# Patient Record
Sex: MALE | Race: OTHER | NOT HISPANIC OR LATINO | Employment: FULL TIME | URBAN - METROPOLITAN AREA
[De-identification: names, ages, dates, MRNs, and addresses within clinical notes are randomized per-mention and may not be internally consistent; named-entity substitution may affect disease eponyms.]

---

## 2020-11-03 ENCOUNTER — HOSPITAL ENCOUNTER (EMERGENCY)
Facility: MEDICAL CENTER | Age: 25
End: 2020-11-04
Attending: EMERGENCY MEDICINE
Payer: COMMERCIAL

## 2020-11-03 DIAGNOSIS — R44.3 HALLUCINATIONS: ICD-10-CM

## 2020-11-03 DIAGNOSIS — F12.90 MARIJUANA USE: ICD-10-CM

## 2020-11-03 PROCEDURE — 99283 EMERGENCY DEPT VISIT LOW MDM: CPT

## 2020-11-04 VITALS
TEMPERATURE: 97.9 F | OXYGEN SATURATION: 92 % | SYSTOLIC BLOOD PRESSURE: 108 MMHG | DIASTOLIC BLOOD PRESSURE: 65 MMHG | HEIGHT: 69 IN | BODY MASS INDEX: 19.26 KG/M2 | HEART RATE: 78 BPM | WEIGHT: 130 LBS | RESPIRATION RATE: 20 BRPM

## 2020-11-04 PROCEDURE — A9270 NON-COVERED ITEM OR SERVICE: HCPCS | Performed by: EMERGENCY MEDICINE

## 2020-11-04 PROCEDURE — 700102 HCHG RX REV CODE 250 W/ 637 OVERRIDE(OP): Performed by: EMERGENCY MEDICINE

## 2020-11-04 RX ORDER — LORAZEPAM 1 MG/1
1 TABLET ORAL ONCE
Status: COMPLETED | OUTPATIENT
Start: 2020-11-04 | End: 2020-11-04

## 2020-11-04 RX ADMIN — LORAZEPAM 1 MG: 1 TABLET ORAL at 00:51

## 2020-11-04 NOTE — DISCHARGE INSTRUCTIONS
You were seen in emergency department for hallucinations in the setting of edible marijuana use.  You were given a medication that should make you sleepy.  We recommend that you relax at home and at home safe environment.    Your symptoms should improve over time.    Recommend taking caution when using marijuana products.    Please return to the emergency department seek medical attention if you develop:  Any new or worsening symptoms, difficulty breathing, ongoing vomiting, any other new or concerning findings

## 2020-11-04 NOTE — ED PROVIDER NOTES
"ED Provider Note    Scribed for Efren Perea M.D. by Juan Luis Quezada. 11/4/2020,  12:40 AM.    Means of Arrival: Walk-in  History obtained from: Patient  History limited by: None    CHIEF COMPLAINT  Chief Complaint   Patient presents with   • Hallucinations     ate 5mg THC, 5mg CBD to help sleep.        HPI  Anibal Reynoso is a 24 y.o. male who presents to the Emergency Department complaining of hallucinations after consuming 10 mg of THC in the form of a medical marijuana gummy shortly prior to arrival. He reports \"blanking out\" and multiple episodes of emesis while under the influence of THC. He states he feels moderately anxious in the room today. He denies any falls or accidents. He has no history of mental health issues and no history of other recreational drug use.    PPE Note: I personally donned full PPE for all patient encounters during this visit, including being clean-shaven with an N95 respirator mask, gloves, and goggles.     Scribe remained outside the patient's room and did not have any contact with the patient for the duration of patient encounter.      REVIEW OF SYSTEMS  GASTROINTESTINAL:  Vomiting  NEUROLOGIC:   Anxious, hallucinations, memory loss    See HPI for further details.   All other systems are negative.     PAST MEDICAL HISTORY  History reviewed. No pertinent past medical history.    FAMILY HISTORY  History reviewed. No pertinent family history.    SOCIAL HISTORY   reports that he has never smoked. He has never used smokeless tobacco. He reports that he does not drink alcohol or use drugs.    SURGICAL HISTORY  History reviewed. No pertinent surgical history.    CURRENT MEDICATIONS  Home Medications     Reviewed by Hetal Lam R.N. (Registered Nurse) on 11/03/20 at 2308  Med List Status: <None>   Medication Last Dose Status        Patient Kristian Taking any Medications                       ALLERGIES  No Known Allergies    PHYSICAL EXAM  VITAL SIGNS: /79   Pulse (!) 110   Temp " "36.5 °C (97.7 °F) (Oral)   Resp 20   Ht 1.753 m (5' 9\")   Wt 59 kg (130 lb)   SpO2 99%   BMI 19.20 kg/m²      Gen: Alert, no acute distress  HEENT: ATNC  Eyes: PERRL, EOMI, normal conjunctiva.   Neck: trachea midline  Resp: no respiratory distress  CV: No JVD, regular rate and rhythm  Abd: non-distended, nontender  Ext: No deformities  Psych: Anxious appearing.  Does not appear to respond to internal stimuli.  Logical thought.  Neuro: speech fluent, GCS 15, normal gait, moves all extremities, no cerebellar dysfunction, no focal neurologic deficits    COURSE & MEDICAL DECISION MAKING  Pertinent Labs & Imaging studies reviewed. (See chart for details)    12:40 AM Patient seen and examined at bedside. Discussed available treatments and stressed the importance of properly reading instructions for dosage of drugs. Patient will be treated with Ativan 1 mg for his symptoms.     Medical Decision Making:  Patient presents after taking a full dose gummy, which is 10 mg.  The recommended dose on the packaging is a half ago me.  He appears anxious and reports \"freaking out\".  He is here with his sober mother.  Patient believes he would like to be discharged home.  Will provide a dose of Ativan for anxiolysis and to help allow him to sleep this off.  He is otherwise well-appearing.  No evidence of trauma to the head, other toxidromes.  He denies any other psychiatric history or medical problems.  The mother is comfortable taking him home.  Patient is able to ambulate with steady gait, no neurologic findings     The patient will return for new or worsening symptoms and is stable at the time of discharge.    DISPOSITION:  Patient will be discharged home in stable condition.    FOLLOW UP:  [facility], Emergency Dept  [address] [facility] [address] [address] [phone]    If symptoms worsen    Your regular doctor.  To establish a primary care provider within our system, please call [phone]      As " needed      FINAL IMPRESSION  1. Hallucinations    2. Marijuana use            IJuan Luis (Scribe), am scribing for, and in the presence of, Efren Perea M.D..    Electronically signed by: Juan Luis Quezada (Scribe), 11/4/2020    Efren ALCALA M.D. personally performed the services described in this documentation, as scribed by Juan Luis Quezada in my presence, and it is both accurate and complete.    E.    The note accurately reflects work and decisions made by me.  Efren Perea M.D.  11/4/2020  3:38 AM

## 2020-11-04 NOTE — ED TRIAGE NOTES
"Chief Complaint   Patient presents with   • Hallucinations     ate 5mg THC, 5mg CBD to help sleep.        Pt amb to triage with steady gait for above complaint. Reports he has never used marijuana before and ate a gummy tonight to help sleep. Pt now anxious, reports visual hallucinations of ' scary things\" that are making him paranoid.     Pt is alert and oriented, speaking in full sentences, follows commands and responds appropriately to questions. Resp are even and unlabored. No behavioral indicators of pain.   Pt placed in lobby. Pt educated on triage process. Pt encouraged to alert staff for any changes.    /79   Pulse (!) 110   Temp 36.5 °C (97.7 °F) (Oral)   Resp 20   Ht 1.753 m (5' 9\")   Wt 59 kg (130 lb)   SpO2 99%   BMI 19.20 kg/m²     "

## 2020-11-04 NOTE — ED NOTES
Patient verbalizes understanding of follow up and when to return to the ED.  All questions answered.  Patient discharged with family

## 2023-03-16 ENCOUNTER — PHARMACY VISIT (OUTPATIENT)
Dept: PHARMACY | Facility: MEDICAL CENTER | Age: 28
End: 2023-03-16
Payer: COMMERCIAL

## 2023-03-16 PROCEDURE — RXMED WILLOW AMBULATORY MEDICATION CHARGE: Performed by: INTERNAL MEDICINE
